# Patient Record
(demographics unavailable — no encounter records)

---

## 2024-10-04 NOTE — HISTORY OF PRESENT ILLNESS
[FreeTextEntry1] : 73y/o male with BPH here today for incontinence and weak urinary flow. The patient states that he cannot control urine for the last 5 months. Hx of kidney stones (30 yrs ago). Nocturia X3, denies hematuria, dysuria, flank pain, or any other urinary issues AIME Occasionally eating chocolate. Adequately hydrating  FHx of PCA: denies Tobacco use: negative Last PSA: 0,15 ng/mL (08/20/2024) Last Creatinine: 1.26 mg/dL (06/11/2024) Last urine Cx: Negative (04/29/2024) Uro meds: Tamsulosin 0.4 mg, Finasteride 5 mg. Testosterone powder  Will F/U for US prostate to check for any improvement.

## 2024-10-07 NOTE — PHYSICAL EXAM
[Right] : right shoulder [5___] : external rotation 5[unfilled]/5 [] : no swelling [TWNoteComboBox7] : active forward flexion 120 degrees

## 2024-10-07 NOTE — DISCUSSION/SUMMARY
[de-identified] : Patient allowed to gently start resuming activities. Discussed change to medication prescription and usage. Bracing options discussed with patient. Hyaluronic Acid R shoulder try topical lidocaine for pain reviewed current medications being used by this patient Patient tolerated the last injection well, will follow up in 5 weeks with Dr Cox after completing CT arthrogram. Follow up with me as needed.

## 2024-10-07 NOTE — HISTORY OF PRESENT ILLNESS
[de-identified] : Patient  his shoulder in December and it happened again after a fall in April, benny NOVAK March early R shoulder and csi beginning of Aug with min help, had zilretta L knee and using a sleeve and has some swelling, on blood thinners for PM, L knee Novak completed 6/25, on morphine for his back

## 2024-10-07 NOTE — PROCEDURE
[FreeTextEntry3] : Euflexxa (Large Joint) with Ultrasound Guidance Viscosupplementation Injection: X-ray evidence of Osteoarthritis on this or prior visit and Patient has tried OTC's including aspirin, Ibuprofen, Aleve etc or prescription NSAIDS, and/or exercises at home and/ or physical therapy without satisfactory response.  An injection of Euflexxa 2ml _#_3__ was injected into theR shoulders). after verbal consent using sterile technique. The risks, benefits, and alternatives to Viscosupplementation injection were explained in full to the patient. Risks outlined include but are not limited to infection, sepsis, bleeding, scarring, skin discoloration, temporary increase in pain, syncopal episode, failure to resolve symptoms, allergic reaction, and symptom recurrence. Signs and symptoms of infection reviewed and patient advised to call immediately for redness, fevers, and/or chills. Patient understood the risks. All questions were answered. After discussion of options, patient requested Viscosupplementation. Oral informed consent was obtained and sterile prep was done of the injection site. Sterile technique was used without complications. The patient tolerated the procedure well. Ice tonight to the injection site.   Ultrasound Guidance was used for the following reasons: altered anatomic landmarks because of erosive arthritis.   Ultrasound guided injection was performed of the knee, visualization of the needle and placement of injection was performed without complication. Large Joint Injection / Aspiration: Lidocaine, Zilretta Ultrasound and Guidance Lidocaine: 3 cc. Needle size: 18 gauge , 1.5 inch.  Zilretta: An Injection of Zilretta 32 Units 5ml ,  was injected into the left knee  Ultrasound Guidance was used  Ultrasound guided injection was performed of the knee, visualization of the needle and placement of injection was performed without complication.   Large Joint Injection was performed because of pain and inflammation. Anesthesia: ethyl chloride sprayed topically.. Patient has tried OTC's including aspirin, Ibuprofen, Aleve etc or prescription NSAIDS, and/or exercises at home and/ or physical therapy without satisfactory response. After verbal consent using sterile preparation and technique. The risks, benefits, and alternatives to aspiration were explained in full to the patient. Risks outlined include but are not limited to infection, sepsis, bleeding, scarring, skin discoloration, temporary increase in pain, syncopal episode, failure to resolve symptoms, allergic reaction and symptom recurrence. Patient understood the risks. All questions were answered. After discussion of options, patient requested an aspiration. Oral informed consent was obtained and sterile prep was done of the injection site. Sterile technique was utilized for the procedure including the preparation of the solutions used for the aspiration. Patient tolerated the procedure well. Advised to ice the injection site this evening. Prep with betadine locally to site. Sterile technique used. Patient tolerated procedure well. Post Procedure Instructions: Patient was advised to call if redness, pain, or fever occur and apply ice for 15 min. out of every hour for the next 12-24 hours as tolerated. patient was advised to rest the joint(s) for 1 days.

## 2024-10-21 NOTE — HISTORY OF PRESENT ILLNESS
[FreeTextEntry1] : 73y/o male with BPH with incontinence and weak urinary flow here today for TRUS. The patient states that his urine control improved with Tamsulosin. Hx of kidney stones (30 yrs ago). Nocturia X3, denies hematuria, dysuria, flank pain, or any other urinary issues AIME Occasionally eating chocolate. Adequately hydrating  FHx of PCA: denies Tobacco use: negative Last PSA: 0,15 ng/mL (08/20/2024) Last Creatinine: 1.26 mg/dL (04/29/2024) Last urine Cx: Negative (08/20/2024) Uro meds: Tamsulosin 0.4 mg, Finasteride 5 mg. Testosterone powder  08/20/2024 - Patient to start Tamsulosin. Tamsulosin could cause hypotension and has to be taken before bed. Will F/U for US prostate to check for any improvement.

## 2024-10-21 NOTE — ASSESSMENT
[FreeTextEntry1] : 71y/o male with BPH with incontinence and weak urinary flow here today for TRUS. The patient states that his urine control improved with Tamsulosin. Hx of kidney stones (30 yrs ago). Nocturia X3, denies hematuria, dysuria, flank pain, or any other urinary issues AIME Occasionally eating chocolate. Adequately hydrating  FHx of PCA: denies Tobacco use: negative Last PSA: 0,15 ng/mL (08/20/2024) Last Creatinine: 1.26 mg/dL (04/29/2024) Last urine Cx: Negative (08/20/2024) Uro meds: Tamsulosin 0.4 mg, Finasteride 5 mg. Testosterone powder  08/20/2024 - Patient to start Tamsulosin. Tamsulosin could cause hypotension and has to be taken before bed. Will F/U for US prostate to check for any improvement.  10/21/2024 - TRUS Prostate Volume: 13.2 cc Length : 36 mm Width : 34 mm Height : 21 mm Impression: normal appearing gland.  Finasteride D/C Patient to continue Tamsulosin. Explained to the patient that Tamsulosin could cause hypotension and has to be taken before bed. Ordering CT renal stone hunt to rule out urinary stones. Will F/U in 6 months for uroflow to check for any improvement.

## 2024-10-21 NOTE — ASSESSMENT
[FreeTextEntry1] : 73y/o male with BPH with incontinence and weak urinary flow here today for TRUS. The patient states that his urine control improved with Tamsulosin. Hx of kidney stones (30 yrs ago). Nocturia X3, denies hematuria, dysuria, flank pain, or any other urinary issues AIME Occasionally eating chocolate. Adequately hydrating  FHx of PCA: denies Tobacco use: negative Last PSA: 0,15 ng/mL (08/20/2024) Last Creatinine: 1.26 mg/dL (04/29/2024) Last urine Cx: Negative (08/20/2024) Uro meds: Tamsulosin 0.4 mg, Finasteride 5 mg. Testosterone powder  08/20/2024 - Patient to start Tamsulosin. Tamsulosin could cause hypotension and has to be taken before bed. Will F/U for US prostate to check for any improvement.  10/21/2024 - TRUS Prostate Volume: 13.2 cc Length : 36 mm Width : 34 mm Height : 21 mm Impression: normal appearing gland.  Finasteride D/C Patient to continue Tamsulosin. Explained to the patient that Tamsulosin could cause hypotension and has to be taken before bed. Ordering CT renal stone hunt to rule out urinary stones. Will F/U in 6 months for uroflow to check for any improvement.

## 2024-10-21 NOTE — HISTORY OF PRESENT ILLNESS
[FreeTextEntry1] : 71y/o male with BPH with incontinence and weak urinary flow here today for TRUS. The patient states that his urine control improved with Tamsulosin. Hx of kidney stones (30 yrs ago). Nocturia X3, denies hematuria, dysuria, flank pain, or any other urinary issues AIME Occasionally eating chocolate. Adequately hydrating  FHx of PCA: denies Tobacco use: negative Last PSA: 0,15 ng/mL (08/20/2024) Last Creatinine: 1.26 mg/dL (04/29/2024) Last urine Cx: Negative (08/20/2024) Uro meds: Tamsulosin 0.4 mg, Finasteride 5 mg. Testosterone powder  08/20/2024 - Patient to start Tamsulosin. Tamsulosin could cause hypotension and has to be taken before bed. Will F/U for US prostate to check for any improvement.

## 2024-10-24 NOTE — HISTORY OF PRESENT ILLNESS
[FreeTextEntry1] : Here to follow multiple ongoing conditions  [de-identified] : cough now coming back some wheeze at night constipation issues, using Movantik but not daily Recent urology consult reviewed Recent orthopedic consultations reviewed Prostate ultrasound unremarkable and finasteride discontinued seen August 20, 2024 with runny nose some hoarseness and sore throat Given azithromycin still with cough yellow sputum saw urologist for urinary issues 8/20/24 started on tamsulosin did well with surgery other than pain, had to free up right testicle, receded back up in to abdomen  Last seen June 5 and clearance for right inguinal hernia repair June 11, 2024 by Rafael Champagne Chronic idiopathic cardiomyopathy decades HFrEF Catheterizations have revealed nonobstructive mild coronary disease last 10/2023 [facility] Congestive heart failure now well controlled with much better medication compliance chronic atrial fibrillation Multiple orthopedic and rheumatologic conditions maroon quality of life Status post defibrillator pacemaker implantation Status post AV node ablation Sees pain management.   seen April 29, 2024 after recent hospitalization congestive heart failure hospitalized [facility] April 8 through April 11, 2024 also had hyponatremiaChronic idiopathic cardiomyopathy decades HFrEF Catheterizations have revealed nonobstructive mild coronary disease last 10/2023 [facility] Congestive heart failure now well controlled with much better medication compliance chronic atrial fibrillation Multiple orthopedic and rheumatologic conditions maroon quality of life Status post defibrillator pacemaker implantation Status post AV node ablation Sees pain management.   Has been much more compliant with his medication Blood showed some mild elevation in potassium to 5.6 and reduction of spironolactone was suggested  appetite off  seen March 24, 2024 worsening urination issues sometimes at night can not get to urinal in time to pee wets himself and floor tring to get pants off did not yet follow up as advised with urologist Last seen February 26 and February 2, 2024 right shoulder was much worse was ruining quality of life saw Dr Schaffer Had Visco supplementation injections twice since last seen to right shoulder had shoulder and knee injections  knee better right shoulder is not pain from this severe still seeing pain management off amiodarone Patient underwent AV yanira ablation due to atrial fibrillation left heart failure and inability to achieve adequate ventricular rate control as well as inappropriate rapidly conductive AF and sudden rapid VT VF possibly triggered by this  seeing pain management  had residual buttock stage I and stage II ulcers these are improved Had visiting nurse set up from [facility]  using aquaphor now Hospitalized at [facility] admitted October 5, 2023 and discharged October 11, 2023 He had slipped and fell at home had pain in area of defibrillator had CT heart had cardiac catheterization non obstructive CAD off warfarin on rivaroxaban 20

## 2024-10-24 NOTE — PHYSICAL EXAM
[No Acute Distress] : no acute distress [Well Nourished] : well nourished [Well Developed] : well developed [Well-Appearing] : well-appearing [Normal Sclera/Conjunctiva] : normal sclera/conjunctiva [PERRL] : pupils equal round and reactive to light [EOMI] : extraocular movements intact [Normal Outer Ear/Nose] : the outer ears and nose were normal in appearance [Normal Oropharynx] : the oropharynx was normal [No JVD] : no jugular venous distention [No Lymphadenopathy] : no lymphadenopathy [Supple] : supple [No Respiratory Distress] : no respiratory distress  [No Accessory Muscle Use] : no accessory muscle use [Normal Rate] : normal rate  [Regular Rhythm] : with a regular rhythm [Normal S1, S2] : normal S1 and S2 [No Carotid Bruits] : no carotid bruits [No Abdominal Bruit] : a ~M bruit was not heard ~T in the abdomen [No Varicosities] : no varicosities [No Palpable Aorta] : no palpable aorta [No Extremity Clubbing/Cyanosis] : no extremity clubbing/cyanosis [Soft] : abdomen soft [Non Tender] : non-tender [Non-distended] : non-distended [No Masses] : no abdominal mass palpated [No HSM] : no HSM [Normal Bowel Sounds] : normal bowel sounds [Normal Posterior Cervical Nodes] : no posterior cervical lymphadenopathy [Normal Anterior Cervical Nodes] : no anterior cervical lymphadenopathy [No CVA Tenderness] : no CVA  tenderness [No Spinal Tenderness] : no spinal tenderness [No Joint Swelling] : no joint swelling [Grossly Normal Strength/Tone] : grossly normal strength/tone [No Rash] : no rash [Coordination Grossly Intact] : coordination grossly intact [No Focal Deficits] : no focal deficits [Normal Gait] : normal gait [Deep Tendon Reflexes (DTR)] : deep tendon reflexes were 2+ and symmetric [Normal Affect] : the affect was normal [Normal Insight/Judgement] : insight and judgment were intact [de-identified] : no rales, mild exp wheeze

## 2024-10-24 NOTE — ASSESSMENT
[FreeTextEntry1] : Cough with yellow phlegm and mild expiratory wheeze not il appearing Congestive heart failure ? better control Blood pressure on lower end unremarkable repair symptomatic right inguinal hernia past AV node ablation and adjustment of defibrillator Recent angiogram which nonobstructive coronary disease and severe cardiomyopathy LVEF 20% severe pain multiple sites ruin quality of life, suffering

## 2024-11-06 NOTE — ASSESSMENT
[FreeTextEntry1] : R shoulder with mild DJD. Xrays and advanced imaging reviewed. He completed Euflexxa 10/7/24. ([facility]) R shoulder CSI on 8/5/24 ([facility]) CT arthrogram to evaluate any tearing in the rotator cuff.  Right now, I don't have a good explanation for the level of pain he has in the right shoulder.  RTo for review.   **Patient receives narcotic pain medication from pain management doctor.

## 2024-11-06 NOTE — HISTORY OF PRESENT ILLNESS
[de-identified] : 11/06/24: Here for follow up.  The patient reports and difficulty with ROM.  He reports 30% improvement after the euflexxa injections. .    10/02/2024: Patient is here for evaluation of right shoulder pain after  his shoulder last December and then after fall in April. Patient has previously done HA injections and CSI in right shoulder with minimal relief. Patient has been seen by Dr. Mancini, currently in PT.   Patient just received Euflexxa #2 on monday 9/30/24 with pearl.   pmhx - htn, high cholesterol, afib (on xarleto and pacemaker)  patient takes morphine and ambien which he gets from his pain management.

## 2024-12-19 NOTE — PHYSICAL EXAM
[No Acute Distress] : no acute distress [Well Nourished] : well nourished [Well Developed] : well developed [Well-Appearing] : well-appearing [Normal Sclera/Conjunctiva] : normal sclera/conjunctiva [Normal Outer Ear/Nose] : the outer ears and nose were normal in appearance [Normal Oropharynx] : the oropharynx was normal [No JVD] : no jugular venous distention [No Lymphadenopathy] : no lymphadenopathy [Supple] : supple [No Respiratory Distress] : no respiratory distress  [No Accessory Muscle Use] : no accessory muscle use [Normal Rate] : normal rate  [Regular Rhythm] : with a regular rhythm [Normal S1, S2] : normal S1 and S2 [No Carotid Bruits] : no carotid bruits [No Abdominal Bruit] : a ~M bruit was not heard ~T in the abdomen [No Varicosities] : no varicosities [No Palpable Aorta] : no palpable aorta [No Extremity Clubbing/Cyanosis] : no extremity clubbing/cyanosis [Soft] : abdomen soft [Non Tender] : non-tender [Non-distended] : non-distended [No Masses] : no abdominal mass palpated [No HSM] : no HSM [Normal Bowel Sounds] : normal bowel sounds [Normal Posterior Cervical Nodes] : no posterior cervical lymphadenopathy [Normal Anterior Cervical Nodes] : no anterior cervical lymphadenopathy [No CVA Tenderness] : no CVA  tenderness [No Spinal Tenderness] : no spinal tenderness [No Joint Swelling] : no joint swelling [Grossly Normal Strength/Tone] : grossly normal strength/tone [No Rash] : no rash [Coordination Grossly Intact] : coordination grossly intact [No Focal Deficits] : no focal deficits [Normal Gait] : normal gait [Deep Tendon Reflexes (DTR)] : deep tendon reflexes were 2+ and symmetric [Normal Affect] : the affect was normal [Normal Insight/Judgement] : insight and judgment were intact

## 2025-02-13 NOTE — ASSESSMENT
[FreeTextEntry1] : Right lateral foot foot wound that has failed 3 courses of doxycycline and care by podiatrist with redness and warmth to the area although patient states it is starting to improve  Complicated cardiac history  unremarkable repair symptomatic right inguinal hernia past AV node ablation and adjustment of defibrillator Recent angiogram which nonobstructive coronary disease and severe cardiomyopathy LVEF 20%

## 2025-02-13 NOTE — PHYSICAL EXAM
[No Acute Distress] : no acute distress [Well Nourished] : well nourished [Well Developed] : well developed [Well-Appearing] : well-appearing [Normal Sclera/Conjunctiva] : normal sclera/conjunctiva [Normal Outer Ear/Nose] : the outer ears and nose were normal in appearance [Normal Oropharynx] : the oropharynx was normal [No JVD] : no jugular venous distention [No Lymphadenopathy] : no lymphadenopathy [Supple] : supple [No Respiratory Distress] : no respiratory distress  [No Accessory Muscle Use] : no accessory muscle use [Normal Rate] : normal rate  [Regular Rhythm] : with a regular rhythm [Normal S1, S2] : normal S1 and S2 [No Carotid Bruits] : no carotid bruits [No Abdominal Bruit] : a ~M bruit was not heard ~T in the abdomen [No Varicosities] : no varicosities [No Palpable Aorta] : no palpable aorta [No Extremity Clubbing/Cyanosis] : no extremity clubbing/cyanosis [Soft] : abdomen soft [Non Tender] : non-tender [Non-distended] : non-distended [No Masses] : no abdominal mass palpated [No HSM] : no HSM [Normal Bowel Sounds] : normal bowel sounds [Normal Posterior Cervical Nodes] : no posterior cervical lymphadenopathy [Normal Anterior Cervical Nodes] : no anterior cervical lymphadenopathy [No CVA Tenderness] : no CVA  tenderness [No Spinal Tenderness] : no spinal tenderness [No Joint Swelling] : no joint swelling [Grossly Normal Strength/Tone] : grossly normal strength/tone [Coordination Grossly Intact] : coordination grossly intact [No Focal Deficits] : no focal deficits [Normal Gait] : normal gait [Deep Tendon Reflexes (DTR)] : deep tendon reflexes were 2+ and symmetric [Normal Affect] : the affect was normal [Normal Insight/Judgement] : insight and judgment were intact [de-identified] : pressure wound cellulitis right foot lateral pressure ? associated osteomyelitis failing outpatient antibiotics

## 2025-02-13 NOTE — HISTORY OF PRESENT ILLNESS
[FreeTextEntry1] : here to follow ongoing conditions Right foot wound redness discomfort new since patient last seen by me [de-identified] : seeing Cade Childress for foot wound, ? from friction, Possibly from walking abnormally due to his multiple orthopedic joint issues and putting pressure on this area told of infection of skin spreading to bone had 3 courses doxycycline lateral aspect right foot has warmth and redness right foot some improvement noted last 2 days tremendous pain this is new problem  constipation issues, using Movantik but not daily Prostate ultrasound unremarkable and finasteride discontinued seen August 20, 2024 with runny nose some hoarseness and sore throat seen June 5 and clearance for right inguinal hernia repair June 11, 2024 by Rafael Champagne Chronic idiopathic cardiomyopathy decades HFrEF Catheterizations have revealed nonobstructive mild coronary disease last 10/2023 [facility] Congestive heart failure now well controlled with much better medication compliance chronic atrial fibrillation Multiple orthopedic and rheumatologic conditions maroon quality of life Status post defibrillator pacemaker implantation Status post AV node ablation Sees pain management.   seen April 29, 2024 after recent hospitalization congestive heart failure hospitalized [facility] April 8 through April 11, 2024 also had hyponatremiaChronic idiopathic cardiomyopathy decades HFrEF Catheterizations have revealed nonobstructive mild coronary disease last 10/2023 [facility] Congestive heart failure now well controlled with much better medication compliance chronic atrial fibrillation Multiple orthopedic and rheumatologic conditions maroon quality of life Status post defibrillator pacemaker implantation Status post AV node ablation Sees pain management.   Jody Sullivan showed some mild elevation in potassium to 5.6 and reduction of spironolactone was suggested  much more compliant with his medication  appetite off  seen March 24, 2024 worsening urination issues sometimes at night can not get to urinal in time to pee wets himself and floor tring to get pants off did not yet follow up as advised with urologist Last seen February 26 and February 2, 2024 right shoulder was much worse was ruining quality of life saw Dr Schaffer Had Visco supplementation injections twice since last seen to right shoulder had shoulder and knee injections  knee better right shoulder is not pain from this severe still seeing pain management off amiodarone Patient underwent AV yanira ablation due to atrial fibrillation left heart failure and inability to achieve adequate ventricular rate control as well as inappropriate rapidly conductive AF and sudden rapid VT VF possibly triggered by this  seeing pain management  had residual buttock stage I and stage II ulcers these are improved Had visiting nurse set up from [facility]  had cardiac catheterization non obstructive CAD off warfarin on rivaroxaban 20

## 2025-02-13 NOTE — PLAN
[FreeTextEntry1] : suggest ER evaluation pt declines I am concerned he has cellulitis and osteomyelitis to check bloods has 2 1/2 appt at wound care center tomorrow Patient will keep his wound care appointment and follow what ever advice they give him

## 2025-03-13 NOTE — ASSESSMENT
[FreeTextEntry1] : 73 M w/ hx of persistent chronic atrial fibrillation, [place], Interstitial Lung Disease,  HFrEF here for first evaluation after recent admission at [facility] for dyspnea w/ exertion, fatigue, and weakness.\par \par SOB\par -improved \par -appears euvolemic today, the patient reports episodes of orthostasis. Will d/c furosemide and monitor symptoms and BP\par -cont with betablocker (succinate 25 mg daily)\par -echo EF45%, unchanged in the last 3 years as per records\par -NICM with normal coronaries on cath\par -rate controlled afib \par -cont with work up for pulmonary/GI/heme pathologies\par \par AFIB: persistent, rate controlled\par -as per records s/p failed ablation in 2017\par - c/w Metoprolol succinate 25 mg daily \par - c/w Eliquis 5mg BID\par \par Patent Foramen Ovale:\par  Noted on transthoracic echocardiogram w/ RV dilation and normal RV systolic function.  RV dilation likely due to underlying pulmonary pathology and [place]. PFO likely incidental &  unlikely contributing to patients exertional dyspnea.  \par \par Heart Failure w/ EF 45%,\par - globally reduced w/ mild-moderate aortic insufficiency. Clinically euvolemic.\par - continue with beta blocker as above\par - stop furosemide as above, recc to monitor for weigh gain> 4 pounds in 2 days, LE edema, orthopnea \par -cont lisinopril 2.5 mg daily \par -recc to report any symptoms or orthostasis\par \par \par f/u in 3 months or sooner if any symptoms  [FreeTextEntry1] : Right lateral foot foot wound being seen weekly at wound care failed 3 courses of doxycycline and care by podiatrist  xray no osteo Complicated cardiac history  unremarkable repair symptomatic right inguinal hernia past AV node ablation and adjustment of defibrillator Recent angiogram which nonobstructive coronary disease and severe cardiomyopathy LVEF 20%

## 2025-03-13 NOTE — HEALTH RISK ASSESSMENT
[0] : 2) Feeling down, depressed, or hopeless: Not at all (0) [PHQ-2 Negative - No further assessment needed] : PHQ-2 Negative - No further assessment needed [[id number]] : 0 [Never] : Never

## 2025-03-13 NOTE — HEALTH RISK ASSESSMENT
[0] : 2) Feeling down, depressed, or hopeless: Not at all (0) [PHQ-2 Negative - No further assessment needed] : PHQ-2 Negative - No further assessment needed [[id number]] : 0 [Never] : Never

## 2025-03-13 NOTE — HISTORY OF PRESENT ILLNESS
[FreeTextEntry1] : Here to follow multiple ongoing conditions [de-identified] : being seen at wound care last 3/10/25 right foot wound last had dressing unwrapped, debrided before this was seeing Cade Childress for foot wound, ? from friction, possibly from walking abnormally due to his multiple orthopedic joint issues and putting pressure on this area, told of infection of skin spreading to bone had 3 courses doxycycline lateral aspect right foot Recent x-ray of foot negative for osteomyelitis end of February 2025 less warmth and redness right foot  tremendous pain   constipation issues, using Movantik but not daily Prostate ultrasound unremarkable and finasteride discontinued seen August 20, 2024 with runny nose some hoarseness and sore throat seen June 5 and clearance for right inguinal hernia repair June 11, 2024 by Rafael Champagne Chronic idiopathic cardiomyopathy decades HFrEF Catheterizations have revealed nonobstructive mild coronary disease last 10/2023 [facility] Congestive heart failure now well controlled with much better medication compliance chronic atrial fibrillation Multiple orthopedic and rheumatologic conditions maroon quality of life Status post defibrillator pacemaker implantation Status post AV node ablation Sees pain management.   seen April 29, 2024 after recent hospitalization congestive heart failure hospitalized [facility] April 8 through April 11, 2024 also had hyponatremiaChronic idiopathic cardiomyopathy decades HFrEF Catheterizations have revealed nonobstructive mild coronary disease last 10/2023 [facility] Congestive heart failure now well controlled with much better medication compliance chronic atrial fibrillation Multiple orthopedic and rheumatologic conditions maroon quality of life Status post defibrillator pacemaker implantation Status post AV node ablation Sees pain management.   Has Nate showed some mild elevation in potassium to 5.6 and reduction of spironolactone was suggested  much more compliant with his medication  appetite off  seen March 24, 2024 worsening urination issues sometimes at night can not get to urinal in time to pee wets himself and floor tring to get pants off did not yet follow up as advised with urologist Last seen February 26 and February 2, 2024 right shoulder was much worse was ruining quality of life saw Dr Schaffer Had Visco supplementation injections twice since last seen to right shoulder had shoulder and knee injections  knee better right shoulder is not pain from this severe still seeing pain management off amiodarone Patient underwent AV yanira ablation due to atrial fibrillation left heart failure and inability to achieve adequate ventricular rate control as well as inappropriate rapidly conductive AF and sudden rapid VT VF possibly triggered by this  seeing pain management  had residual buttock stage I and stage II ulcers these are improved Had visiting nurse set up from [facility]  had cardiac catheterization non obstructive CAD off warfarin on rivaroxaban 20

## 2025-03-13 NOTE — PHYSICAL EXAM
[No Acute Distress] : no acute distress [Well Nourished] : well nourished [Normal Sclera/Conjunctiva] : normal sclera/conjunctiva [Normal Outer Ear/Nose] : the outer ears and nose were normal in appearance [Normal Oropharynx] : the oropharynx was normal [No JVD] : no jugular venous distention [No Lymphadenopathy] : no lymphadenopathy [Supple] : supple [No Respiratory Distress] : no respiratory distress  [No Accessory Muscle Use] : no accessory muscle use [Normal Rate] : normal rate  [Regular Rhythm] : with a regular rhythm [Normal S1, S2] : normal S1 and S2 [No Carotid Bruits] : no carotid bruits [No Abdominal Bruit] : a ~M bruit was not heard ~T in the abdomen [No Varicosities] : no varicosities [No Palpable Aorta] : no palpable aorta [No Extremity Clubbing/Cyanosis] : no extremity clubbing/cyanosis [Soft] : abdomen soft [Non Tender] : non-tender [Non-distended] : non-distended [No Masses] : no abdominal mass palpated [No HSM] : no HSM [Normal Bowel Sounds] : normal bowel sounds [Normal Posterior Cervical Nodes] : no posterior cervical lymphadenopathy [Normal Anterior Cervical Nodes] : no anterior cervical lymphadenopathy [No CVA Tenderness] : no CVA  tenderness [No Spinal Tenderness] : no spinal tenderness [No Joint Swelling] : no joint swelling [Grossly Normal Strength/Tone] : grossly normal strength/tone [Coordination Grossly Intact] : coordination grossly intact [No Focal Deficits] : no focal deficits [Normal Gait] : normal gait [Deep Tendon Reflexes (DTR)] : deep tendon reflexes were 2+ and symmetric [Normal Affect] : the affect was normal [Normal Insight/Judgement] : insight and judgment were intact [de-identified] : no signs spreading infection right footosteomyelitis failing outpatient antibiotics

## 2025-03-13 NOTE — PHYSICAL EXAM
[No Acute Distress] : no acute distress [Well Nourished] : well nourished [Normal Sclera/Conjunctiva] : normal sclera/conjunctiva [Normal Outer Ear/Nose] : the outer ears and nose were normal in appearance [Normal Oropharynx] : the oropharynx was normal [No JVD] : no jugular venous distention [No Lymphadenopathy] : no lymphadenopathy [Supple] : supple [No Respiratory Distress] : no respiratory distress  [No Accessory Muscle Use] : no accessory muscle use [Normal Rate] : normal rate  [Regular Rhythm] : with a regular rhythm [Normal S1, S2] : normal S1 and S2 [No Carotid Bruits] : no carotid bruits [No Abdominal Bruit] : a ~M bruit was not heard ~T in the abdomen [No Varicosities] : no varicosities [No Palpable Aorta] : no palpable aorta [No Extremity Clubbing/Cyanosis] : no extremity clubbing/cyanosis [Soft] : abdomen soft [Non Tender] : non-tender [Non-distended] : non-distended [No Masses] : no abdominal mass palpated [No HSM] : no HSM [Normal Bowel Sounds] : normal bowel sounds [Normal Posterior Cervical Nodes] : no posterior cervical lymphadenopathy [Normal Anterior Cervical Nodes] : no anterior cervical lymphadenopathy [No CVA Tenderness] : no CVA  tenderness [No Spinal Tenderness] : no spinal tenderness [No Joint Swelling] : no joint swelling [Grossly Normal Strength/Tone] : grossly normal strength/tone [Coordination Grossly Intact] : coordination grossly intact [No Focal Deficits] : no focal deficits [Normal Gait] : normal gait [Deep Tendon Reflexes (DTR)] : deep tendon reflexes were 2+ and symmetric [Normal Affect] : the affect was normal [Normal Insight/Judgement] : insight and judgment were intact [de-identified] : no signs spreading infection right footosteomyelitis failing outpatient antibiotics

## 2025-03-13 NOTE — ASSESSMENT
[FreeTextEntry1] : Right lateral foot foot wound being seen weekly at wound care failed 3 courses of doxycycline and care by podiatrist  xray no osteo Complicated cardiac history  unremarkable repair symptomatic right inguinal hernia past AV node ablation and adjustment of defibrillator Recent angiogram which nonobstructive coronary disease and severe cardiomyopathy LVEF 20%   No

## 2025-03-13 NOTE — HISTORY OF PRESENT ILLNESS
[FreeTextEntry1] : Here to follow multiple ongoing conditions [de-identified] : being seen at wound care last 3/10/25 right foot wound last had dressing unwrapped, debrided before this was seeing aCde Childress for foot wound, ? from friction, possibly from walking abnormally due to his multiple orthopedic joint issues and putting pressure on this area, told of infection of skin spreading to bone had 3 courses doxycycline lateral aspect right foot Recent x-ray of foot negative for osteomyelitis end of February 2025 less warmth and redness right foot  tremendous pain   constipation issues, using Movantik but not daily Prostate ultrasound unremarkable and finasteride discontinued seen August 20, 2024 with runny nose some hoarseness and sore throat seen June 5 and clearance for right inguinal hernia repair June 11, 2024 by Rafael Champagne Chronic idiopathic cardiomyopathy decades HFrEF Catheterizations have revealed nonobstructive mild coronary disease last 10/2023 [facility] Congestive heart failure now well controlled with much better medication compliance chronic atrial fibrillation Multiple orthopedic and rheumatologic conditions maroon quality of life Status post defibrillator pacemaker implantation Status post AV node ablation Sees pain management.   seen April 29, 2024 after recent hospitalization congestive heart failure hospitalized [facility] April 8 through April 11, 2024 also had hyponatremiaChronic idiopathic cardiomyopathy decades HFrEF Catheterizations have revealed nonobstructive mild coronary disease last 10/2023 [facility] Congestive heart failure now well controlled with much better medication compliance chronic atrial fibrillation Multiple orthopedic and rheumatologic conditions maroon quality of life Status post defibrillator pacemaker implantation Status post AV node ablation Sees pain management.   Has Nate showed some mild elevation in potassium to 5.6 and reduction of spironolactone was suggested  much more compliant with his medication  appetite off  seen March 24, 2024 worsening urination issues sometimes at night can not get to urinal in time to pee wets himself and floor tring to get pants off did not yet follow up as advised with urologist Last seen February 26 and February 2, 2024 right shoulder was much worse was ruining quality of life saw Dr Schaffer Had Visco supplementation injections twice since last seen to right shoulder had shoulder and knee injections  knee better right shoulder is not pain from this severe still seeing pain management off amiodarone Patient underwent AV yanira ablation due to atrial fibrillation left heart failure and inability to achieve adequate ventricular rate control as well as inappropriate rapidly conductive AF and sudden rapid VT VF possibly triggered by this  seeing pain management  had residual buttock stage I and stage II ulcers these are improved Had visiting nurse set up from [facility]  had cardiac catheterization non obstructive CAD off warfarin on rivaroxaban 20

## 2025-04-24 NOTE — ASSESSMENT
[FreeTextEntry1] : Status post amputation right fifth toe for chronic mild osteomyelitis despite good circulation and this complicated patient Extensive hospital records reviewed Complicated cardiac history  unremarkable repair symptomatic right inguinal hernia past AV node ablation and adjustment of defibrillator Recent angiogram which nonobstructive coronary disease and severe cardiomyopathy LVEF on echocardiogram in hospital a bit better at 25 to 30%

## 2025-04-24 NOTE — PHYSICAL EXAM
[No Acute Distress] : no acute distress [Normal Sclera/Conjunctiva] : normal sclera/conjunctiva [Normal Outer Ear/Nose] : the outer ears and nose were normal in appearance [No JVD] : no jugular venous distention [No Respiratory Distress] : no respiratory distress  [No Accessory Muscle Use] : no accessory muscle use [Clear to Auscultation] : lungs were clear to auscultation bilaterally [Normal Rate] : normal rate  [Regular Rhythm] : with a regular rhythm [Normal S1, S2] : normal S1 and S2

## 2025-04-24 NOTE — HISTORY OF PRESENT ILLNESS
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 decades, nonobstructive coronary artery disease last angiogram October 2023, chronic atrial fibrillation, status post defibrillator pacemaker implantation, status post AV node ablation, multiple orthopedic and rheumatologic disabling conditions, hospitalized for chronic osteomyelitis of the toe had amputation right 5th toe  bloods were ok echo LVEF 25-30% Was seen by ID and surgery Had trouble tolerating vancomycin intravenously with vomiting or nausea or stomach upset This was eventually changed to ceftazidime while in inpatient and discharged on oral doxycycline to continue until May 11 Pathology showed mild chronic osteomyelitis myelitis Inpatient labs were reviewed nothing concerning

## 2025-05-08 NOTE — PHYSICAL EXAM
[No Acute Distress] : no acute distress [Normal Sclera/Conjunctiva] : normal sclera/conjunctiva [PERRL] : pupils equal round and reactive to light [Normal Outer Ear/Nose] : the outer ears and nose were normal in appearance [No JVD] : no jugular venous distention [No Respiratory Distress] : no respiratory distress  [No Accessory Muscle Use] : no accessory muscle use [Clear to Auscultation] : lungs were clear to auscultation bilaterally [Normal Rate] : normal rate  [Regular Rhythm] : with a regular rhythm [Normal S1, S2] : normal S1 and S2

## 2025-05-11 NOTE — ASSESSMENT
[FreeTextEntry1] : Overnight observation not admission for transient expressive aphasia unclear if patient had TIA with negative workup or had medication side effect Emergency record reviewed test results all reviewed after patient had left the office when they were obtained from the hospital on date of visitrecent amputation right fifth toe for chronic mild osteomyelitis despite good circulation and this complicated patient Complicated cardiac history  unremarkable repair symptomatic right inguinal hernia past AV node ablation and adjustment of defibrillator Recent angiogram which nonobstructive coronary disease and severe cardiomyopathy LVEF on echocardiogram in hospital last 25 to 30%

## 2025-05-11 NOTE — PLAN
[FreeTextEntry1] : Neurology referral made patient given names and numbers will make appointment No change in medication at this time from what he was discharged on from the hospital Continue weekly wound care follow-up

## 2025-05-11 NOTE — HISTORY OF PRESENT ILLNESS
[FreeTextEntry1] : Here for follow-up after recent hospitalization for TIA admitted 5/1/25 discharged 5/2/25 in extended [facility] area [de-identified] : bloods ok had Ct scans was told of some type of blockage on left sideon May 1 went out with friends and wife, went to take a sip, eyes rolled up, went slack was able to speak, move both arms and feet not well had CT scans done, sent out on aspirin 81 mg in addition to other medsLast seen April 24, 2025 after hospitalization for osteomyelitis as detailed below Emergency room records were obtained on date of visit subsequent to patient's leaving the office after he signed consent and hospital was contacted Emergency room note states that apparently patient took 30 mg of morphine prior to going out to dinner. Initial differential diagnosis was TIA, CVA, intracranial hemorrhage, medication side effect, electrolyte abnormality, and anemia patient was found to have some expressive aphasia with no other focal signs. CT head stroke protocol showed no demonstratable acute intracranial event, background cerebral atrophic changes, bilateral chronic small vessel ischemia CTA carotid showed moderate carotid siphon atherosclerotic disease with no cerebral large vessel occlusive vasculopathy. Study demonstrated minimal atherosclerotic plaque along the common carotid bifurcations and carotid bulbs with moderate bilateral carotid siphon calcifications and no evidence for hemodynamically significant stenosis incidental hypoplastic right A1 segment and fetal right PCA. CT neuro perfusion showed no evidence for significant cerebral ischemic mismatching  Patient underwent subsequent evaluation for tPA use looking for aortic dissection or mural thrombus chest x-ray did not show any evidence of such bloods and urine were unremarkable. 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, nonobstructive coronary artery disease last angiogram October 2023, chronic atrial fibrillation, status post defibrillator pacemaker implantation, status post AV node ablation, multiple orthopedic and rheumatologic disabling conditions, hospitalized for chronic osteomyelitis of the toe had amputation right 5th toe bloods were ok echo LVEF 25-30% Was seen by ID and surgery Had trouble tolerating vancomycin intravenously with vomiting or nausea or stomach upset This was eventually changed to ceftazidime while in inpatient and discharged on oral doxycycline to continue until May 11 Pathology showed mild chronic osteomyelitis myelitis Inpatient labs were reviewed nothing concerning.

## 2025-05-11 NOTE — HISTORY OF PRESENT ILLNESS
[FreeTextEntry1] : Here for follow-up after recent hospitalization for TIA admitted 5/1/25 discharged 5/2/25 in extended [facility] area [de-identified] : bloods ok had Ct scans was told of some type of blockage on left sideon May 1 went out with friends and wife, went to take a sip, eyes rolled up, went slack was able to speak, move both arms and feet not well had CT scans done, sent out on aspirin 81 mg in addition to other medsLast seen April 24, 2025 after hospitalization for osteomyelitis as detailed below Emergency room records were obtained on date of visit subsequent to patient's leaving the office after he signed consent and hospital was contacted Emergency room note states that apparently patient took 30 mg of morphine prior to going out to dinner. Initial differential diagnosis was TIA, CVA, intracranial hemorrhage, medication side effect, electrolyte abnormality, and anemia patient was found to have some expressive aphasia with no other focal signs. CT head stroke protocol showed no demonstratable acute intracranial event, background cerebral atrophic changes, bilateral chronic small vessel ischemia CTA carotid showed moderate carotid siphon atherosclerotic disease with no cerebral large vessel occlusive vasculopathy. Study demonstrated minimal atherosclerotic plaque along the common carotid bifurcations and carotid bulbs with moderate bilateral carotid siphon calcifications and no evidence for hemodynamically significant stenosis incidental hypoplastic right A1 segment and fetal right PCA. CT neuro perfusion showed no evidence for significant cerebral ischemic mismatching  Patient underwent subsequent evaluation for tPA use looking for aortic dissection or mural thrombus chest x-ray did not show any evidence of such bloods and urine were unremarkable. 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, nonobstructive coronary artery disease last angiogram October 2023, chronic atrial fibrillation, status post defibrillator pacemaker implantation, status post AV node ablation, multiple orthopedic and rheumatologic disabling conditions, hospitalized for chronic osteomyelitis of the toe had amputation right 5th toe bloods were ok echo LVEF 25-30% Was seen by ID and surgery Had trouble tolerating vancomycin intravenously with vomiting or nausea or stomach upset This was eventually changed to ceftazidime while in inpatient and discharged on oral doxycycline to continue until May 11 Pathology showed mild chronic osteomyelitis myelitis Inpatient labs were reviewed nothing concerning.

## 2025-07-10 NOTE — ASSESSMENT
[FreeTextEntry1] : malaise, not feeling well right foot wound next appt wound care tomorrow recent L1 and L2 fracture Complicated cardiac history  unremarkable repair symptomatic right inguinal hernia past AV node ablation and adjustment of defibrillator Recent angiogram which nonobstructive coronary disease and severe cardiomyopathy LVEF on echocardiogram in hospital last 25 to 30%

## 2025-07-10 NOTE — HISTORY OF PRESENT ILLNESS
[FreeTextEntry1] : here to follow ongoing conditions [de-identified] : not feeling well right foot wound saw wound care 2 days ago, follow up tomorrow Dr Giang last hospitalization L1 L2 fractures [facility] 2 months ago no fevers some cough colored sputum  had CT scans done, sent out on aspirin 81 mg in addition to other medsLast seen April 24, 2025 after hospitalization for osteomyelitis as detailed below Emergency room records were obtained on date of visit subsequent to patient's leaving the office after he signed consent and hospital was contacted Emergency room note states that apparently patient took 30 mg of morphine prior to going out to dinner. Initial differential diagnosis was TIA, CVA, intracranial hemorrhage, medication side effect, electrolyte abnormality, and anemia patient was found to have some expressive aphasia with no other focal signs. CT head stroke protocol showed no demonstratable acute intracranial event, background cerebral atrophic changes, bilateral chronic small vessel ischemia CTA carotid showed moderate carotid siphon atherosclerotic disease with no cerebral large vessel occlusive vasculopathy. Study demonstrated minimal atherosclerotic plaque along the common carotid bifurcations and carotid bulbs with moderate bilateral carotid siphon calcifications and no evidence for hemodynamically significant stenosis incidental hypoplastic right A1 segment and fetal right PCA. CT neuro perfusion showed no evidence for significant cerebral ischemic mismatching  Patient underwent subsequent evaluation for tPA use looking for aortic dissection or mural thrombus chest x-ray did not show any evidence of such bloods and urine were unremarkable. 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, nonobstructive coronary artery disease last angiogram October 2023, chronic atrial fibrillation, status post defibrillator pacemaker implantation, status post AV node ablation, multiple orthopedic and rheumatologic disabling conditions, hospitalized for chronic osteomyelitis of the toe had amputation right 5th toe bloods were ok echo LVEF 25-30% Was seen by ID and surgery Had trouble tolerating vancomycin intravenously with vomiting or nausea or stomach upset This was eventually changed to ceftazidime while in inpatient and discharged on oral doxycycline to continue until May 11 Pathology showed mild chronic osteomyelitis myelitis Inpatient labs were reviewed nothing concerning.

## 2025-07-10 NOTE — PHYSICAL EXAM
[No Acute Distress] : no acute distress [Normal Sclera/Conjunctiva] : normal sclera/conjunctiva [PERRL] : pupils equal round and reactive to light [Normal Outer Ear/Nose] : the outer ears and nose were normal in appearance [No JVD] : no jugular venous distention [No Respiratory Distress] : no respiratory distress  [No Accessory Muscle Use] : no accessory muscle use [Clear to Auscultation] : lungs were clear to auscultation bilaterally [Normal Rate] : normal rate  [Regular Rhythm] : with a regular rhythm [Normal S1, S2] : normal S1 and S2 [No Carotid Bruits] : no carotid bruits [No Abdominal Bruit] : a ~M bruit was not heard ~T in the abdomen [No Varicosities] : no varicosities [No Palpable Aorta] : no palpable aorta [No Extremity Clubbing/Cyanosis] : no extremity clubbing/cyanosis [Soft] : abdomen soft [Non Tender] : non-tender [Non-distended] : non-distended [No HSM] : no HSM [Normal Bowel Sounds] : normal bowel sounds [Normal Axillary Nodes] : no axillary lymphadenopathy [Normal Posterior Cervical Nodes] : no posterior cervical lymphadenopathy [Normal Anterior Cervical Nodes] : no anterior cervical lymphadenopathy [No CVA Tenderness] : no CVA  tenderness [No Joint Swelling] : no joint swelling [No Rash] : no rash [Coordination Grossly Intact] : coordination grossly intact [Normal Affect] : the affect was normal [Normal Insight/Judgement] : insight and judgment were intact